# Patient Record
Sex: FEMALE | Race: WHITE | NOT HISPANIC OR LATINO | URBAN - METROPOLITAN AREA
[De-identification: names, ages, dates, MRNs, and addresses within clinical notes are randomized per-mention and may not be internally consistent; named-entity substitution may affect disease eponyms.]

---

## 2021-01-27 ENCOUNTER — AMBULATORY - HEALTHEAST (OUTPATIENT)
Dept: NURSING | Facility: CLINIC | Age: 56
End: 2021-01-27

## 2021-02-10 ENCOUNTER — OFFICE VISIT - HEALTHEAST (OUTPATIENT)
Dept: FAMILY MEDICINE | Facility: CLINIC | Age: 56
End: 2021-02-10

## 2021-02-10 DIAGNOSIS — T78.2XXA ANAPHYLAXIS, INITIAL ENCOUNTER: ICD-10-CM

## 2021-02-10 DIAGNOSIS — Z76.89 ENCOUNTER TO ESTABLISH CARE: ICD-10-CM

## 2021-02-10 DIAGNOSIS — E03.9 HYPOTHYROIDISM, UNSPECIFIED TYPE: ICD-10-CM

## 2021-02-10 DIAGNOSIS — Z63.6 CAREGIVER BURDEN: ICD-10-CM

## 2021-02-10 DIAGNOSIS — Z00.00 ROUTINE GENERAL MEDICAL EXAMINATION AT A HEALTH CARE FACILITY: ICD-10-CM

## 2021-02-10 DIAGNOSIS — Z12.31 VISIT FOR SCREENING MAMMOGRAM: ICD-10-CM

## 2021-02-10 LAB
CHOLEST SERPL-MCNC: 247 MG/DL
ERYTHROCYTE [DISTWIDTH] IN BLOOD BY AUTOMATED COUNT: 12.8 % (ref 11–14.5)
FASTING STATUS PATIENT QL REPORTED: YES
FASTING STATUS PATIENT QL REPORTED: YES
GLUCOSE BLD-MCNC: 78 MG/DL (ref 70–99)
HCT VFR BLD AUTO: 43.9 % (ref 35–47)
HDLC SERPL-MCNC: 58 MG/DL
HGB BLD-MCNC: 14.8 G/DL (ref 12–16)
LDLC SERPL CALC-MCNC: 145 MG/DL
MCH RBC QN AUTO: 31 PG (ref 27–34)
MCHC RBC AUTO-ENTMCNC: 33.7 G/DL (ref 32–36)
MCV RBC AUTO: 92 FL (ref 80–100)
PLATELET # BLD AUTO: 301 THOU/UL (ref 140–440)
PMV BLD AUTO: 8.6 FL (ref 7–10)
RBC # BLD AUTO: 4.78 MILL/UL (ref 3.8–5.4)
T3 SERPL-MCNC: 105 NG/DL (ref 45–175)
T4 FREE SERPL-MCNC: 0.9 NG/DL (ref 0.7–1.8)
TRIGL SERPL-MCNC: 221 MG/DL
TSH SERPL DL<=0.005 MIU/L-ACNC: 2.68 UIU/ML (ref 0.3–5)
WBC: 6.7 THOU/UL (ref 4–11)

## 2021-02-10 RX ORDER — EPINEPHRINE 0.3 MG/.3ML
0.3 INJECTION SUBCUTANEOUS PRN
Qty: 2 | Refills: 0 | Status: SHIPPED | OUTPATIENT
Start: 2021-02-10

## 2021-02-10 SDOH — SOCIAL STABILITY - SOCIAL INSECURITY: DEPENDENT RELATIVE NEEDING CARE AT HOME: Z63.6

## 2021-02-10 ASSESSMENT — MIFFLIN-ST. JEOR: SCORE: 1456.92

## 2021-02-17 ENCOUNTER — AMBULATORY - HEALTHEAST (OUTPATIENT)
Dept: NURSING | Facility: CLINIC | Age: 56
End: 2021-02-17

## 2021-06-05 VITALS
SYSTOLIC BLOOD PRESSURE: 100 MMHG | HEART RATE: 60 BPM | HEIGHT: 65 IN | DIASTOLIC BLOOD PRESSURE: 82 MMHG | WEIGHT: 190.7 LBS | BODY MASS INDEX: 31.77 KG/M2

## 2021-06-15 NOTE — PATIENT INSTRUCTIONS - HE
"There is a new shingles vaccine that is 97% effective. It is the Shingrix vaccine and is recommended for those 50 and older. We recommend the vaccine even if you have had shingles or the older vaccine against shingles- Zostavax. Insurance coverage for the vaccine varies. I recommend you check with your insurance to verify if, when and where it is covered. The vaccine is covered by most commercial insurance but Medicare does not cover the vaccine. It may be covered by Medicare Part D (your drug/pharmacy plan) and sometimes it is covered only at a pharmacy instead of here in the clinic. If it is covered in the clinic, you may schedule a nurse visit anytime to get the first dose of the vaccine. The second dose is recommended two months after the first and should be gotten by 6 months after the first.   About 10% of people will get a sore, red reaction at the site of the injection. Some people may also feel a little sick or nauseated after the injection. This may happen with either the first and/or second vaccine.      Consider asking insurance about HIV and Hepatitis C blood work - should be covered.    When you find out about Cologaurd screening coverage from insurance, please let us know and we can order this test.     MINDFULNESS    \"Mindfulness is about being fully awake in our lives. It is about perceiving the exquisite vividness of each moment.\"      - Edilberto Blanco  Mindfulness-Based Stress Reduction (MBSR) is a blend of meditation, body awareness, and yoga:   learning through practice and study how your body handles (and can resolve) stress neurologically.     Mindfulness Resources (all are free):  1. \"Calm\" lauren- free trial has guided 10min sessions on anxiety, gratitude, sleep, happiness, managing stress, etc.   2. \"Smiling Minds\" lauren- short guided sessions for children and adults  3. \"Insight Timer\" lauren- free meditation timer with musical or bell tones, can also stream guided meditations    4. Free 8 week " MBSR program online: [email or web address]

## 2021-06-27 ENCOUNTER — HEALTH MAINTENANCE LETTER (OUTPATIENT)
Age: 56
End: 2021-06-27

## 2021-06-30 NOTE — PROGRESS NOTES
Progress Notes by Maki Crespo MD at 2/10/2021  8:00 AM     Author: Maki Crespo MD Service: -- Author Type: Physician    Filed: 2/10/2021  8:46 AM Encounter Date: 2/10/2021 Status: Signed    : Maki Crespo MD (Physician)       FEMALE PREVENTATIVE EXAM    Assessment and Plan:     Patient has been advised of split billing requirements and indicates understanding: No    Monet was seen today for establish care and annual exam.    Encounter to establish care    Routine general medical examination at a health care facility  - Pap done 3/2018 normal with neg cotesting; prior abnormal 2011 with LSIL and normal on colposcopy per pt report; defers next pap until the 5 year janette.  - Shingrix reviewed  - Declines flu, tetanus  - Declines colon cancer screening curently; strongly advised  - Mammogram encouraged, declines  - Had first dose COVID vaccine as caregiver  -     HM2(CBC w/o Differential)  -     Lipid Cascade  -     Glucose    Visit for screening mammogram  -     Mammo Screening Bilateral; Future    Anaphylaxis, initial encounter  Hx of bee sting allergy  -     EPINEPHrine (EPIPEN/ADRENACLICK/AUVI-Q) 0.3 mg/0.3 mL injection; Inject 0.3 mL (0.3 mg total) as directed as needed for anaphylaxis. Inject into thigh.    Hypothyroidism, unspecified type  On low dose 50mcg synthroid in her 40s, then 8year hiatus from this and upon follow up physical 3/2018 with [facility] and Thyroid labs came back normal so she was not advised to restart Synthroid. Will recheck today  -     Thyroid Stimulating Hormone (TSH)  -     T3, Total  -     T4, Free    Caregiving burden  Significant caregiver burden was combative special needs son with Prader-Willi syndrome.  She indicates having the resources she needs at this time.  She declines PHQ-9/JACOBO-7, and is not interested in counseling nor medications. Politely declines option for Care Connection/Social work referral.  Her son does go see a therapist.  She has him  "on a wait list for a group home with specific skills to manage this condition.   Mindfulness resources reviewed.     25 of the 47 minutes on the date of the encounter was spent outside of preventative health care with regard to doing chart review, patient visit and documentation of other pertinent medical conditions or history.    Next follow up:  Return in about 1 year (around 2/10/2022) for Annual physical, Recheck if sx not improved or sooner if worsening.    Immunization Review  Adult Imm Review: Declines immunizations today    I discussed the following with the patient:   Adult Healthy Living: Importance of regular exercise  Healthy nutrition  Getting adequate sleep  Stress management  Supplement use  Herbal medications/alternative medical therapies      Subjective:   Chief Complaint: Monet Lorenzo is an 55 y.o. female here for a preventative health visit.    Patient has been advised of split billing requirements and indicates understanding: Yes  HPI:    Chief Complaint   Patient presents with   ? Establish Care   ? Annual Exam     fasting, Thyroid testing- hx of taking medications but has not been on medications for years       Desires thyroid testing: She was previously on low dose Synthroid in her 40s until about 8 years ago when she was out of insurance after her   ().  She had a physical 3/2018 with [facility] and Thyroid labs came back normal so she was not advised to restart Synthroid.    Pap smear was normal at that physical.   Hx of LSIL in  and colposcopy \"was normal\"- no further appointments for follow ups that she was aware of.    Would like refill of epi pen. She had an incident with 20 bee stings and went to the ER 2019 for evaluation and was given Rx for Epi pen. After mowing she felt lightheaded, confused, took benadryl but felt normal by the time she got to the ER. Full body rash. No trouble breathing that she could recall.       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 He goes to his dad's every other weekend.    Two other older adult children- sister helps take care of Dank    Alcohol use- a few times per month     Former smoker- quit in August 2020.    Maki Crespo MD         Healthy Habits  Are you taking a daily aspirin? No  Do you typically exercising at least 40 min, 3-4 times per week?  Yes  Do you usually eat at least 4 servings of fruit and vegetables a day, include whole grains and fiber and avoid regularly eating high fat foods? NO  Have you had an eye exam in the past two years? Yes  Do you see a dentist twice per year? NO  Do you have any concerns regarding sleep? No    Safety Screen  If you own firearms, are they secured in a locked gun cabinet or with trigger locks? The patient does not own any firearms  Do you feel you are safe where you are living?: Yes (2/10/2021  7:55 AM)  Do you feel you are safe in your relationship(s)?: Yes (2/10/2021  7:55 AM)      Review of Systems:  Please see above.  The rest of the review of systems are negative for all systems.       Cancer Screening       Status Date      MAMMOGRAM Overdue 1965     PAP SMEAR Overdue 10/1/1986           Patient Care Team:  Maki Crespo MD as PCP - General (Family Medicine)        History     Reviewed By Date/Time Sections Reviewed    Maki Crespo MD 2/10/2021  8:44 AM Medical, Surgical, Tobacco, Family    Maki Crespo MD 2/10/2021  8:14 AM Social Documentation    Dorcas Horton LPN 2/10/2021  7:55 AM Tobacco, Alcohol, Drug Use            Objective:   Vital Signs:   Visit Vitals  /82 (Patient Site: Left Arm, Patient Position: Sitting, Cuff Size: Adult Large)   Pulse 60   Ht 5' 4.75\" (1.645 m)   Wt 190 lb 11.2 oz (86.5 kg)   LMP  (LMP Unknown)   BMI 31.98 kg/m     "       PHYSICAL EXAM  Constitutional: Patient is oriented to person, place, and time. Patient appears well-developed and well-nourished. No distress.   Head: Normocephalic and atraumatic.   Ears: External ear and TMs normal bilaterally.  Nose: Nose normal.   Mouth/Throat: Oropharynx is clear and moist. No oropharyngeal exudate.   Eyes: Conjunctivae and EOM are normal. Pupils are equal, round, and reactive to light. No discharge. No scleral icterus.   Neck: Neck supple. No JVD present. No tracheal deviation present. No thyromegaly present.  Breasts: not indicated based on USPSTF recommendations for asymptomatic women and pt agrees/declines exam  Cardiovascular: Normal rate, regular rhythm, normal heart sounds and intact distal pulses. No murmur heard.   Pulmonary/Chest: Effort normal and breath sounds normal. Patient has no wheezes, no rales, exhibits no tenderness.   Abdominal: Soft. No masses. There is no tenderness.   Lymphadenopathy:  Patient has no cervical adenopathy.   Neurological: Patient is alert and oriented to person, place, and time. Patient has normal reflexes. No cranial nerve deficit. Coordination normal.   Skin: Skin is warm and dry. No rash noted. No pallor.   Pelvic: not indicated based on USPSTF recommendations for asymptomatic women  Psychiatric: Patient has good eye contact without any psychomotor retardation or stereotypic behaviors.  normal mood and affect. Judgment and thought content normal.   Speech is regular rate and rhythm.       The ASCVD Risk score ([place] CHARLY Jr., et al., 2013) failed to calculate for the following reasons:    Cannot find a previous HDL lab    Cannot find a previous total cholesterol lab         Medication List          Accurate as of February 10, 2021  8:44 AM. If you have any questions, ask your nurse or doctor.            CONTINUE taking these medications    EPINEPHrine 0.3 mg/0.3 mL injection  Also known as: EPIPEN/ADRENACLICK/AUVI-Q  INSTRUCTIONS: Inject 0.3 mL (0.3  "mg total) as directed as needed for anaphylaxis. Inject into thigh.  Doctor's comments: Please dispense product with lowest cost to patient           STOP taking these medications    diphenhydrAMINE-zinc acetate cream  Also known as: BENADRYL  Stopped by: Maki Crespo MD           Where to Get Your Medications      These medications were sent to [id number] - [place], [place] - [address]  [address] [place] [facility] [address]    Phone: [phone]     EPINEPHrine 0.3 mg/0.3 mL injection         Additional Screenings Completed Today:   Declined PHQ9 and GAD7 \"wouldn't do anything about it\"; denies SI/HI       "

## 2021-10-17 ENCOUNTER — HEALTH MAINTENANCE LETTER (OUTPATIENT)
Age: 56
End: 2021-10-17

## 2022-04-03 ENCOUNTER — HEALTH MAINTENANCE LETTER (OUTPATIENT)
Age: 57
End: 2022-04-03

## 2022-10-02 ENCOUNTER — HEALTH MAINTENANCE LETTER (OUTPATIENT)
Age: 57
End: 2022-10-02

## 2023-08-12 ENCOUNTER — HEALTH MAINTENANCE LETTER (OUTPATIENT)
Age: 58
End: 2023-08-12